# Patient Record
Sex: FEMALE | Race: WHITE | NOT HISPANIC OR LATINO | Employment: FULL TIME | URBAN - METROPOLITAN AREA
[De-identification: names, ages, dates, MRNs, and addresses within clinical notes are randomized per-mention and may not be internally consistent; named-entity substitution may affect disease eponyms.]

---

## 2024-01-25 ENCOUNTER — HOSPITAL ENCOUNTER (EMERGENCY)
Facility: HOSPITAL | Age: 49
Discharge: HOME | End: 2024-01-25
Attending: EMERGENCY MEDICINE
Payer: MEDICAID

## 2024-01-25 VITALS
OXYGEN SATURATION: 99 % | HEIGHT: 66 IN | HEART RATE: 86 BPM | RESPIRATION RATE: 20 BRPM | TEMPERATURE: 98.8 F | WEIGHT: 165 LBS | DIASTOLIC BLOOD PRESSURE: 58 MMHG | SYSTOLIC BLOOD PRESSURE: 142 MMHG | BODY MASS INDEX: 26.52 KG/M2

## 2024-01-25 DIAGNOSIS — T78.2XXA ANAPHYLAXIS, INITIAL ENCOUNTER: Primary | ICD-10-CM

## 2024-01-25 PROCEDURE — 25010000002 MIDAZOLAM PER 1 MG: Performed by: EMERGENCY MEDICINE

## 2024-01-25 PROCEDURE — 96365 THER/PROPH/DIAG IV INF INIT: CPT

## 2024-01-25 PROCEDURE — 99283 EMERGENCY DEPT VISIT LOW MDM: CPT

## 2024-01-25 PROCEDURE — 96375 TX/PRO/DX INJ NEW DRUG ADDON: CPT

## 2024-01-25 PROCEDURE — 25010000002 DEXAMETHASONE SODIUM PHOSPHATE 100 MG/10ML SOLUTION: Performed by: PHYSICIAN ASSISTANT

## 2024-01-25 PROCEDURE — 25810000003 SODIUM CHLORIDE 0.9 % SOLUTION: Performed by: PHYSICIAN ASSISTANT

## 2024-01-25 RX ORDER — INSULIN ASPART 100 [IU]/ML
INJECTION, SOLUTION INTRAVENOUS; SUBCUTANEOUS
COMMUNITY

## 2024-01-25 RX ORDER — HYDROXYZINE PAMOATE 50 MG/1
50 CAPSULE ORAL 3 TIMES DAILY PRN
COMMUNITY

## 2024-01-25 RX ORDER — DULOXETIN HYDROCHLORIDE 60 MG/1
60 CAPSULE, DELAYED RELEASE ORAL DAILY
COMMUNITY

## 2024-01-25 RX ORDER — INSULIN GLARGINE 100 [IU]/ML
34 INJECTION, SOLUTION SUBCUTANEOUS DAILY
COMMUNITY

## 2024-01-25 RX ORDER — PREDNISONE 10 MG/1
TABLET ORAL
Qty: 48 EACH | Refills: 0 | Status: SHIPPED | OUTPATIENT
Start: 2024-01-25

## 2024-01-25 RX ORDER — FAMOTIDINE 10 MG/ML
20 INJECTION, SOLUTION INTRAVENOUS ONCE
Status: COMPLETED | OUTPATIENT
Start: 2024-01-25 | End: 2024-01-25

## 2024-01-25 RX ORDER — EPINEPHRINE 0.3 MG/.3ML
0.3 INJECTION SUBCUTANEOUS ONCE
Qty: 1 EACH | Refills: 0 | Status: SHIPPED | OUTPATIENT
Start: 2024-01-25 | End: 2024-01-25

## 2024-01-25 RX ORDER — METOPROLOL TARTRATE 50 MG/1
50 TABLET, FILM COATED ORAL 2 TIMES DAILY
COMMUNITY

## 2024-01-25 RX ORDER — MIDAZOLAM HYDROCHLORIDE 1 MG/ML
1 INJECTION INTRAMUSCULAR; INTRAVENOUS ONCE
Status: COMPLETED | OUTPATIENT
Start: 2024-01-25 | End: 2024-01-25

## 2024-01-25 RX ADMIN — DEXAMETHASONE SODIUM PHOSPHATE 10 MG: 10 INJECTION, SOLUTION INTRAMUSCULAR; INTRAVENOUS at 18:14

## 2024-01-25 RX ADMIN — FAMOTIDINE 20 MG: 10 INJECTION, SOLUTION INTRAVENOUS at 18:14

## 2024-01-25 RX ADMIN — MIDAZOLAM HYDROCHLORIDE 1 MG: 1 INJECTION, SOLUTION INTRAMUSCULAR; INTRAVENOUS at 19:24

## 2024-01-25 RX ADMIN — SODIUM CHLORIDE 1000 ML: 9 INJECTION, SOLUTION INTRAVENOUS at 18:14

## 2024-01-25 NOTE — Clinical Note
[facility] EMERGENCY DEPARTMENT  [address]  [facility] [address]  Phone: [phone]    Carline Salcido was seen and treated in our emergency department on 1/25/2024.  She may return to work on 01/27/2024.         Thank you for choosing [facility].    Roberto Lino MD

## 2024-01-26 NOTE — ED PROVIDER NOTES
"Subjective  History of Present Illness:    Chief Complaint: Allergic reaction  History of Present Illness: 48-year-old female presents after allergic reaction, she states that she ate a Snickers with almond, at approximately 4:00 shortly after she began to experience hives, itching, redness, and felt like her mouth was swelling.  EMS was called, this occurred while at work, she was transported to the emergency department and she received epinephrine and Benadryl and route.  She states she has never eaten a Snickers with almond before and she is unsure if she has eaten almonds in the past.  She states she has eaten normal Snickers in the past with no problem  Onset: Sudden onset  Duration: Just prior to arrival  Exacerbating / Alleviating factors: Sneakers with almonds  Associated symptoms: Feeling short of breath      Nurses Notes reviewed and agree, including vitals, allergies, social history and prior medical history.     REVIEW OF SYSTEMS: All systems reviewed and not pertinent unless noted.    Review of Systems   Skin:  Positive for rash.   All other systems reviewed and are negative.      Past Medical History:   Diagnosis Date    Diabetes mellitus     Hypertension        Allergies:    Nuts      Past Surgical History:   Procedure Laterality Date    BREAST SURGERY      TUBAL ABDOMINAL LIGATION           Social History     Socioeconomic History    Marital status: Single   Tobacco Use    Smoking status: Never    Smokeless tobacco: Never   Substance and Sexual Activity    Alcohol use: Yes    Drug use: Defer    Sexual activity: Defer         History reviewed. No pertinent family history.    Objective  Physical Exam:  /58   Pulse 86   Temp 98.8 °F (37.1 °C) (Oral)   Resp 20   Ht 167.6 cm (66\")   Wt 74.8 kg (165 lb)   SpO2 99%   BMI 26.63 kg/m²      Physical Exam  Vitals and nursing note reviewed.   Constitutional:       Appearance: She is well-developed.   HENT:      Mouth/Throat:      Pharynx: Oropharynx " is clear.   Cardiovascular:      Rate and Rhythm: Normal rate and regular rhythm.   Pulmonary:      Effort: Pulmonary effort is normal.      Breath sounds: Normal breath sounds.   Abdominal:      General: Bowel sounds are normal.      Palpations: Abdomen is soft.   Musculoskeletal:         General: Normal range of motion.      Cervical back: Normal range of motion and neck supple.   Skin:     General: Skin is warm and dry.      Findings: Erythema and rash present.             Comments: Blotchy rash and hives on face, neck, upper extremities   Neurological:      Mental Status: She is alert and oriented to person, place, and time.      Deep Tendon Reflexes: Reflexes are normal and symmetric.           Procedures    ED Course:         Lab Results (last 24 hours)       ** No results found for the last 24 hours. **             No radiology results from the last 24 hrs       Medical Decision Making  Patient Presentation 48-year-old female presented with anaphylactic reaction to almonds    DDX allergic reaction anaphylaxis, anxiety, stress    Data Review/ Non ED Records /Analysis/Ordering unique tests no review of previous records    Independent Review Studies no labs done    Intervention/Re-evaluation patient received epinephrine and route, while in the ED she received IV Pepcid, and steroids on reevaluation her symptoms had improved    Independent Clinician no consultation    Risk Stratification tools/clinical decision rules patient presented with anaphylactic reaction to almonds, significant risk for sudden death due to anaphylaxis, she was watched for several hours, had no rebound effects, was discharged home on steroids, and an EpiPen.    Shared Decision Making discussed the anaphylactic plan of care with the patient and she was given an EpiPen on discharge    Disposition patient stable for discharge    Problems Addressed:  Anaphylaxis, initial encounter: complicated acute illness or injury    Amount and/or Complexity  of Data Reviewed  External Data Reviewed: labs and notes.    Risk  Prescription drug management.          Final diagnoses:   Anaphylaxis, initial encounter          Lex Winchester Jr., PA-C  01/25/24 2278

## 2024-10-14 ENCOUNTER — HOSPITAL ENCOUNTER (EMERGENCY)
Facility: HOSPITAL | Age: 49
Discharge: HOME | End: 2024-10-14
Attending: EMERGENCY MEDICINE | Admitting: EMERGENCY MEDICINE
Payer: MEDICAID

## 2024-10-14 VITALS
HEIGHT: 67 IN | BODY MASS INDEX: 26.21 KG/M2 | DIASTOLIC BLOOD PRESSURE: 86 MMHG | OXYGEN SATURATION: 100 % | RESPIRATION RATE: 16 BRPM | HEART RATE: 73 BPM | TEMPERATURE: 98.4 F | WEIGHT: 167 LBS | SYSTOLIC BLOOD PRESSURE: 136 MMHG

## 2024-10-14 DIAGNOSIS — E86.0 DEHYDRATION: ICD-10-CM

## 2024-10-14 DIAGNOSIS — E11.65 HYPERGLYCEMIA DUE TO DIABETES MELLITUS: Primary | ICD-10-CM

## 2024-10-14 LAB
ALBUMIN SERPL-MCNC: 4.5 G/DL (ref 3.5–5.2)
ALBUMIN/GLOB SERPL: 2 G/DL
ALP SERPL-CCNC: 104 U/L (ref 39–117)
ALT SERPL W P-5'-P-CCNC: 48 U/L (ref 1–33)
ANION GAP SERPL CALCULATED.3IONS-SCNC: 12.4 MMOL/L (ref 5–15)
AST SERPL-CCNC: 40 U/L (ref 1–32)
BASOPHILS # BLD AUTO: 0.04 10*3/MM3 (ref 0–0.2)
BASOPHILS NFR BLD AUTO: 0.3 % (ref 0–1.5)
BILIRUB SERPL-MCNC: 0.9 MG/DL (ref 0–1.2)
BILIRUB UR QL STRIP: NEGATIVE
BUN SERPL-MCNC: 9 MG/DL (ref 6–20)
BUN/CREAT SERPL: 11.5 (ref 7–25)
CALCIUM SPEC-SCNC: 8.8 MG/DL (ref 8.6–10.5)
CHLORIDE SERPL-SCNC: 97 MMOL/L (ref 98–107)
CLARITY UR: CLEAR
CO2 SERPL-SCNC: 26.6 MMOL/L (ref 22–29)
COLOR UR: YELLOW
CREAT SERPL-MCNC: 0.78 MG/DL (ref 0.57–1)
DEPRECATED RDW RBC AUTO: 38.3 FL (ref 37–54)
EGFRCR SERPLBLD CKD-EPI 2021: 92.7 ML/MIN/1.73
EOSINOPHIL # BLD AUTO: 0.1 10*3/MM3 (ref 0–0.4)
EOSINOPHIL NFR BLD AUTO: 0.8 % (ref 0.3–6.2)
ERYTHROCYTE [DISTWIDTH] IN BLOOD BY AUTOMATED COUNT: 12.1 % (ref 12.3–15.4)
GLOBULIN UR ELPH-MCNC: 2.2 GM/DL
GLUCOSE BLDC GLUCOMTR-MCNC: 370 MG/DL (ref 70–130)
GLUCOSE BLDC GLUCOMTR-MCNC: 412 MG/DL (ref 70–130)
GLUCOSE SERPL-MCNC: 420 MG/DL (ref 65–99)
GLUCOSE UR STRIP-MCNC: ABNORMAL MG/DL
HCT VFR BLD AUTO: 48.1 % (ref 34–46.6)
HGB BLD-MCNC: 15.9 G/DL (ref 12–15.9)
HGB UR QL STRIP.AUTO: NEGATIVE
HOLD SPECIMEN: NORMAL
IMM GRANULOCYTES # BLD AUTO: 0.03 10*3/MM3 (ref 0–0.05)
IMM GRANULOCYTES NFR BLD AUTO: 0.3 % (ref 0–0.5)
KETONES UR QL STRIP: NEGATIVE
LEUKOCYTE ESTERASE UR QL STRIP.AUTO: NEGATIVE
LYMPHOCYTES # BLD AUTO: 2.63 10*3/MM3 (ref 0.7–3.1)
LYMPHOCYTES NFR BLD AUTO: 22.3 % (ref 19.6–45.3)
MCH RBC QN AUTO: 27.9 PG (ref 26.6–33)
MCHC RBC AUTO-ENTMCNC: 33.1 G/DL (ref 31.5–35.7)
MCV RBC AUTO: 84.4 FL (ref 79–97)
MONOCYTES # BLD AUTO: 0.66 10*3/MM3 (ref 0.1–0.9)
MONOCYTES NFR BLD AUTO: 5.6 % (ref 5–12)
NEUTROPHILS NFR BLD AUTO: 70.7 % (ref 42.7–76)
NEUTROPHILS NFR BLD AUTO: 8.31 10*3/MM3 (ref 1.7–7)
NITRITE UR QL STRIP: NEGATIVE
PH UR STRIP.AUTO: 6 [PH] (ref 5–8)
PLATELET # BLD AUTO: 255 10*3/MM3 (ref 140–450)
PMV BLD AUTO: 9.9 FL (ref 6–12)
POTASSIUM SERPL-SCNC: 3.9 MMOL/L (ref 3.5–5.2)
PROT SERPL-MCNC: 6.7 G/DL (ref 6–8.5)
PROT UR QL STRIP: NEGATIVE
RBC # BLD AUTO: 5.7 10*6/MM3 (ref 3.77–5.28)
SODIUM SERPL-SCNC: 136 MMOL/L (ref 136–145)
SP GR UR STRIP: 1.01 (ref 1–1.03)
UROBILINOGEN UR QL STRIP: ABNORMAL
WBC NRBC COR # BLD AUTO: 11.77 10*3/MM3 (ref 3.4–10.8)
WHOLE BLOOD HOLD COAG: NORMAL
WHOLE BLOOD HOLD SPECIMEN: NORMAL

## 2024-10-14 PROCEDURE — 99283 EMERGENCY DEPT VISIT LOW MDM: CPT

## 2024-10-14 PROCEDURE — 85025 COMPLETE CBC W/AUTO DIFF WBC: CPT

## 2024-10-14 PROCEDURE — 80053 COMPREHEN METABOLIC PANEL: CPT

## 2024-10-14 PROCEDURE — 81003 URINALYSIS AUTO W/O SCOPE: CPT

## 2024-10-14 PROCEDURE — 82948 REAGENT STRIP/BLOOD GLUCOSE: CPT

## 2024-10-14 PROCEDURE — 25810000003 LACTATED RINGERS SOLUTION

## 2024-10-14 PROCEDURE — 36415 COLL VENOUS BLD VENIPUNCTURE: CPT

## 2024-10-14 RX ORDER — SODIUM CHLORIDE 0.9 % (FLUSH) 0.9 %
10 SYRINGE (ML) INJECTION AS NEEDED
Status: DISCONTINUED | OUTPATIENT
Start: 2024-10-14 | End: 2024-10-14 | Stop reason: HOSPADM

## 2024-10-14 RX ADMIN — SODIUM CHLORIDE, POTASSIUM CHLORIDE, SODIUM LACTATE AND CALCIUM CHLORIDE 1000 ML: 600; 310; 30; 20 INJECTION, SOLUTION INTRAVENOUS at 15:38

## 2024-10-14 NOTE — Clinical Note
[facility] EMERGENCY DEPARTMENT [place]  [address]  [facility] [address]  Phone: [phone]  Fax: [phone]    Carline Salcido was seen and treated in our emergency department on 10/14/2024.  She may return to work on 10/15/2024.         Thank you for choosing [facility].    Omari Waddell MD

## 2024-10-14 NOTE — FSED PROVIDER NOTE
"CHIEF COMPLAINT  Hyperglycemia     HISTORY OF PRESENT ILLNESS:  Carline Salcido is a 49 y.o. female who presents with elevated blood glucose reading.  She was at her primary care doctor today establishing care when they noticed her point-of-care glucose was \"high\".  States the cutoff for them is 500.  She was sent to the emergency for evaluation.  Patient states she feels fine, just fatigued.  States she has been a type II diabetic since COVID roughly 4 years ago.  She takes 34 units of Lantus in the morning and a sliding scale of Humalog at 6, 8, and 10 increments respectively.  States that this morning she did have some blurry vision that is since resolved.  She admits that she did administer 10 units of Humalog at 1230, then ate lunch, and then went to her primary care doctor appointment at 130.  Upon arrival her blood glucose is 370.      PAST MEDICAL HISTORY  Past Medical History:   Diagnosis Date    Diabetes mellitus     Hypertension      Reviewed the imported nursing notes    PAST SURGICAL HISTORY  Past Surgical History:   Procedure Laterality Date    BREAST SURGERY      TUBAL ABDOMINAL LIGATION       Reviewed the imported nursing notes    ALLERGIES  Allergies   Allergen Reactions    Nuts Hives     REACTION AFTER EATING SNICKERS W/ALMONDS       MEDICATIONS       Medication List        CONTINUE taking these medications      DULoxetine 60 MG capsule  Commonly known as: CYMBALTA     hydrOXYzine pamoate 50 MG capsule  Commonly known as: VISTARIL     Insulin Aspart 100 UNIT/ML injection  Commonly known as: novoLOG     insulin glargine 100 UNIT/ML injection  Commonly known as: LANTUS, SEMGLEE     metoprolol tartrate 50 MG tablet  Commonly known as: LOPRESSOR     predniSONE 10 MG (48) dose pack  Commonly known as: DELTASONE  As directed            SOCIAL HISTORY    Social History     Tobacco Use    Smoking status: Never    Smokeless tobacco: Never   Substance Use Topics    Alcohol use: Yes    Drug use: Defer " "    The patient otherwise denies any further social history.  Reviewed the imported nursing notes      FAMILY HISTORY  No family history on file.  The patient otherwise patient denies any further family history.  Reviewed the imported nursing notes      REVIEW OF SYSTEMS  Reviewed and negative/not pertinent unless mentioned in the HPI        PHYSICAL EXAM  Vitals: /86   Pulse 73   Temp 98.4 °F (36.9 °C) (Oral)   Resp 16   Ht 170.2 cm (67\")   Wt 75.8 kg (167 lb)   SpO2 100%   BMI 26.16 kg/m²      General Appearance: Awake and Alert, cooperative, no distress   Head:  Normocephalic, atraumatic   Eyes: Conjunctiva clear, corneas clear   Ears:  Normal external ears,   Nose: Nares normal and clear   Throat: Lips, mucosa dry   Neck:  Trachea midline, no stridor   Lungs:  Clear to auscultation bilaterally, respirations unlabored   Heart: Regular, No rub   Abdomen: Nondistended, non tender   Genitourinary:  Pelvic:  Rectal: Not Performed  Not Performed  Not Performed   Extremities: Extremities Atraumatic   Vascular: Present in all extremities   Skin:  Skin no rashes or lesions, delayed turgor    Neurologic: Non Focal , CN 2-12 grossly intact, GCS 15   Psyc: Not Performed         MEDICAL DECISION MAKING - ED COURSE/PROCEDURE/DDX:    PULSE OX: Interpretation by me on room air Is normal  SpO2 Readings from Last 1 Encounters:   10/14/24 100%          CARDIAC MONITOR: nsr  Pulse Readings from Last 1 Encounters:   10/14/24 73            I reviewed the nursing notes: YES  I reviewed and discussed with EMS:   External documents reviewed:   Additional history taken from: Previous notes     Discussed with consulting physician  additionally, the admitting / accepting provider was contacted with handoff      LAB REVIEWED: Interpretation of all unique test ordered  Labs Reviewed   COMPREHENSIVE METABOLIC PANEL - Abnormal; Notable for the following components:       Result Value    Glucose 420 (*)     Chloride 97 (*)     ALT " (SGPT) 48 (*)     AST (SGOT) 40 (*)     All other components within normal limits    Narrative:     GFR Normal >60  Chronic Kidney Disease <60  Kidney Failure <15     CBC WITH AUTO DIFFERENTIAL - Abnormal; Notable for the following components:    WBC 11.77 (*)     RBC 5.70 (*)     Hematocrit 48.1 (*)     RDW 12.1 (*)     Neutrophils, Absolute 8.31 (*)     All other components within normal limits   POCT GLUCOSE FINGERSTICK - Abnormal; Notable for the following components:    Glucose 370 (*)     All other components within normal limits   RAINBOW DRAW    Narrative:     The following orders were created for panel order [place] Draw.  Procedure                               Abnormality         Status                     ---------                               -----------         ------                     Green Top (Gel)[[id number]]                                  Final result               Lavender Top[[id number]]                                     Final result               Gold Top - SST[[id number]]                                   Final result               Gray Top[[id number]]                                         Final result               Light Blue Top[[id number]]                                   Final result                 Please view results for these tests on the individual orders.   URINALYSIS W/ MICROSCOPIC IF INDICATED (NO CULTURE)   GREEN TOP   LAVENDER TOP   GOLD TOP - SST   GRAY TOP   LIGHT BLUE TOP   CBC AND DIFFERENTIAL    Narrative:     The following orders were created for panel order CBC & Differential.  Procedure                               Abnormality         Status                     ---------                               -----------         ------                     CBC Auto Differential[[id number]]        Abnormal            Final result                 Please view results for these tests on the individual orders.       IMAGING REVIEWED  My X-ray interpretation:   My CT interpretation:  "  My Ultrasound interpretation:   No orders to display       Procedures:    Decision rules/scores:        Drug therapy provided in the ED and monitored as needed given IV/PO :   Medications   sodium chloride 0.9 % flush 10 mL (has no administration in time range)   lactated ringers bolus 1,000 mL (1,000 mL Intravenous New Bag 10/14/24 1538)       Vitals Trend:  Vitals:    10/14/24 1513 10/14/24 1600 10/14/24 1630   BP: 143/92 140/81 136/86   BP Location: Left arm     Patient Position: Sitting     Pulse: 77 68 73   Resp: 16     Temp: 98.4 °F (36.9 °C)     TempSrc: Oral     SpO2: 96% 97% 100%   Weight: 75.8 kg (167 lb)     Height: 170.2 cm (67\")         Carline Salcido is a 49 y.o. female who presents to the emergency department with the acute illness as stated within HPI  Shared medical decision making regarding a detailed discussion with the patient concerning this ED encounter, the differential diagnosis considered DKA, hyperglycemia, HHS, dehydration, electrolyte abnormalities, and the emergency room imaging and lab testing done today The patient and/or family have been given an opportunity to ask questions and exhibit an understanding of what happened today in the emergency room.        ED Course as of 10/14/24 1656   Mon Oct 14, 2024   1541 CBC & Differential(!)  Non-actionable [NC]   1550 Comprehensive Metabolic Panel(!!)  Noted hyperglycemia with normal CO2 and gap [NC]   1551 Hypochloride indicates dehydration [NC]   1611 Considered treating with iv insulin but the patient has k of 3.9, evidence would not support aggressive correction of hyperglycemia with her lab studies and lack of symptoms. She has her home doses of Lantus and Humalog available at home  [NC]   1654 Patient states she feels improved after being hydrated with the IV fluids as well as a glass of water at the bedside.  She was refilled today and prescriptions for insulin and her Dexcom by primary doctor.  She reports that these " "prescriptions are ready and she can pick them up only home.  There is no indication to continue to monitor her glucose here in the emergency department for downtrending.  She feels comfortable monitoring at home and continuing her insulin at home to decrease it.  She is aware of the diabetic diet and keep monitoring.  She thinks that the loss of her dad a few weeks ago is what made her \"fall off the wagon\" and being less well-controlled after blood glucose levels.  She continue to monitor and follow-up with her primary doctor next week for reevaluation.  And repeat laboratory studies [NC]      ED Course User Index  [NC] Agustín Elizalde PA-C           Condition considered emergent due to:  1) Acuity  2) Failure or unavailable treatment in the outpatient setting  3) Risk of deterioration and decompensation given the multiple differential diagnoses that  need to be ruled out      Amount and/or Complexity of Data Reviewed  Clinical lab tests: as above noted in MDM  Tests in the radiology section of CPT®: as above noted in MDM  Tests in the medicine section of CPT®: as above noted in MDM  Independent visualization of images, tracings, or specimens: as above noted in MDM        CLINICAL IMPRESSION:  Final diagnoses:   Hyperglycemia due to diabetes mellitus   Dehydration      DISPOSITION:  discharge      As with my usual standard practice patient was advised to return for any reason for any  complaint at any time whatsoever. Please refer to the discharge instructions, AVS paperwork  and prescriptions written for treatment plan.      Agustín Elizalde PA-C   Reviewed and Electronically signed  10/14/2024  16:56 EDT      This report was dictated utilizing a voice recognition system which has a propensity to miss  small words such as a, an, the, no, he,she,him, her,his and not. Please read this report carefully,  and if any discrepancy or uncertainty is present, please contact the author directly for  clarification  "